# Patient Record
Sex: FEMALE | Race: WHITE
[De-identification: names, ages, dates, MRNs, and addresses within clinical notes are randomized per-mention and may not be internally consistent; named-entity substitution may affect disease eponyms.]

---

## 2018-12-23 ENCOUNTER — HOSPITAL ENCOUNTER (EMERGENCY)
Dept: HOSPITAL 74 - JERFT | Age: 63
Discharge: HOME | End: 2018-12-23
Payer: COMMERCIAL

## 2018-12-23 VITALS — SYSTOLIC BLOOD PRESSURE: 147 MMHG | DIASTOLIC BLOOD PRESSURE: 72 MMHG | HEART RATE: 85 BPM | TEMPERATURE: 98.1 F

## 2018-12-23 VITALS — BODY MASS INDEX: 30.9 KG/M2

## 2018-12-23 DIAGNOSIS — Z79.84: ICD-10-CM

## 2018-12-23 DIAGNOSIS — H10.31: Primary | ICD-10-CM

## 2018-12-23 DIAGNOSIS — E11.9: ICD-10-CM

## 2018-12-23 DIAGNOSIS — I10: ICD-10-CM

## 2018-12-23 NOTE — PDOC
History of Present Illness





- General


Chief Complaint: Eye Problem


Stated Complaint: SENT BY PCP R/O PINK EYE


Time Seen by Provider: 12/23/18 09:59


History Source: Patient


Exam Limitations: No Limitations





- History of Present Illness


Initial Comments: 





12/23/18 10:17


States had onset of redness and drainage to right eye since yesterday. Was seen 

at an urgent care who recommended referral to emergency department for 

evaluation of eye and illness. Patient states woke up this morning with a stuck 

together with yellow drainage. Denies pain, denies any visual changes, denies 

any history of eye injury or illness including glaucoma. Was concerned about 

her diabetes and treatment. Patient states blood sugars have been normal.


Timing/Duration: unsure, 24 hours


Severity: mild, moderate


Associated Symptoms: reports: denies symptoms





Past History





- Travel


Traveled outside of the country in the last 30 days: No


Close contact w/someone who was outside of country & ill: No





- Past Medical History


Allergies/Adverse Reactions: 


 Allergies











Allergy/AdvReac Type Severity Reaction Status Date / Time


 


No Known Allergies Allergy   Verified 12/23/18 09:17











Home Medications: 


Ambulatory Orders





Atorvastatin Ca [Lipitor] 20 mg PO HS 12/23/18 


Ciprofloxacin 0.3% Eye Drops [Ciloxan 0.3% Eye Drops --] 2 drop OD Q4HWA #1 

bottle 12/23/18 


Metformin HCl [Glucophage] 500 mg PO BID 12/23/18 








COPD: No


Diabetes: Yes


HTN: Yes





- Surgical History


Cholecystectomy: Yes





- Immunization History


Immunization Up to Date: Yes





- Suicide/Smoking/Psychosocial Hx


Smoking History: Never smoked


Information on smoking cessation initiated: No


Hx Alcohol Use: No


Drug/Substance Use Hx: No





**Review of Systems





- Review of Systems


Able to Perform ROS?: Yes


Is the patient limited English proficient: Yes


Constitutional: Yes: Symptoms Reported, See HPI, Malaise.  No: Fever


HEENTM: Yes: Symptoms Reported, See HPI, Tearing.  No: Eye Pain, Blurred Vision


Respiratory: Yes: See HPI.  No: Symptoms reported, Cough


Musculoskeletal: Yes: See HPI.  No: Symptoms Reported


All Other Systems: Reviewed and Negative





*Physical Exam





- Vital Signs


 Last Vital Signs











Temp Pulse Resp BP Pulse Ox


 


 98.1 F   85   18   147/72   96 


 


 12/23/18 09:18  12/23/18 09:18  12/23/18 09:18  12/23/18 09:18  12/23/18 09:18














- Physical Exam


General Appearance: Yes: Nourished, Appropriately Dressed, Apparent Distress


HEENT: positive: GARRETT, TMs Normal, Other (injected right eye, with tearing and 

yellowish drainage noted. Visual acuity is within normal limits. Is nontender 

and no pain with pressure  pushing eye)


Neck: positive: Supple.  negative: Tender, Lymphadenopathy (R), Lymphadenopathy 

(L)


Respiratory/Chest: positive: Lungs Clear





Moderate Sedation





- Procedure Monitoring


Vital Signs: 


Procedure Monitoring Vital Signs











Temperature  98.1 F   12/23/18 09:18


 


Pulse Rate  85   12/23/18 09:18


 


Respiratory Rate  18   12/23/18 09:18


 


Blood Pressure  147/72   12/23/18 09:18


 


O2 Sat by Pulse Oximetry (%)  96   12/23/18 09:18











Medical Decision Making





- Medical Decision Making





12/23/18 10:19


Conjunctivitis, O evidence of intraocular pressure or pain, vision is within 

normal limits. Will treat with ciprofloxacin drops





*DC/Admit/Observation/Transfer


Diagnosis at time of Disposition: 


Conjunctivitis


Qualifiers:


 Conjunctivitis type: acute Acute conjunctivitis type: bacterial Laterality: 

right Qualified Code(s): H10.31 - Unspecified acute conjunctivitis, right eye








- Discharge Dispostion


Disposition: HOME


Condition at time of disposition: Stable


Decision to Admit order: No





- Prescriptions


Prescriptions: 


Ciprofloxacin 0.3% Eye Drops [Ciloxan 0.3% Eye Drops --] 2 drop OD Q4HWA #1 

bottle





- Referrals


Referrals: 


Chacha Edmondson MD [Primary Care Provider] - 





- Patient Instructions


Printed Discharge Instructions:  DI for Conjunctivitis


Additional Instructions: 


Rest, avoid rubbing eyes


Wash hands frequently as this is very contagious


Wash hands, use eye drops as directed, wash hands after use


Do not share eyedrops with other person to may become infected as this will 

infect them





Ciprofloxacin drops 2 drops to affected eye 4 times a day for 5 days





Avoid contact with others until redness and discharge is gone from eyes.


Followup with ophthalmology or private physician as needed





- Post Discharge Activity

## 2020-01-16 ENCOUNTER — HOSPITAL ENCOUNTER (EMERGENCY)
Dept: HOSPITAL 74 - JER | Age: 65
LOS: 1 days | Discharge: ANOTHER HOSPITAL | End: 2020-01-17
Payer: COMMERCIAL

## 2020-01-16 VITALS — BODY MASS INDEX: 32.7 KG/M2

## 2020-01-16 VITALS — TEMPERATURE: 97.9 F

## 2020-01-16 DIAGNOSIS — E11.9: ICD-10-CM

## 2020-01-16 DIAGNOSIS — Z79.84: ICD-10-CM

## 2020-01-16 DIAGNOSIS — I71.2: Primary | ICD-10-CM

## 2020-01-16 DIAGNOSIS — E78.5: ICD-10-CM

## 2020-01-16 DIAGNOSIS — I10: ICD-10-CM

## 2020-01-16 LAB
ALBUMIN SERPL-MCNC: 3.2 G/DL (ref 3.4–5)
ALP SERPL-CCNC: 88 U/L (ref 45–117)
ALT SERPL-CCNC: 24 U/L (ref 13–61)
ANION GAP SERPL CALC-SCNC: 6 MMOL/L (ref 8–16)
AST SERPL-CCNC: 21 U/L (ref 15–37)
BASOPHILS # BLD: 0.8 % (ref 0–2)
BILIRUB SERPL-MCNC: 0.2 MG/DL (ref 0.2–1)
BUN SERPL-MCNC: 24.6 MG/DL (ref 7–18)
CALCIUM SERPL-MCNC: 8.8 MG/DL (ref 8.5–10.1)
CHLORIDE SERPL-SCNC: 107 MMOL/L (ref 98–107)
CO2 SERPL-SCNC: 25 MMOL/L (ref 21–32)
CREAT SERPL-MCNC: 1.6 MG/DL (ref 0.55–1.3)
DEPRECATED RDW RBC AUTO: 13.9 % (ref 11.6–15.6)
EOSINOPHIL # BLD: 3.7 % (ref 0–4.5)
GLUCOSE SERPL-MCNC: 218 MG/DL (ref 74–106)
HCT VFR BLD CALC: 36 % (ref 32.4–45.2)
HGB BLD-MCNC: 12 GM/DL (ref 10.7–15.3)
INR BLD: 0.87 (ref 0.83–1.09)
LYMPHOCYTES # BLD: 40.3 % (ref 8–40)
MCH RBC QN AUTO: 30.2 PG (ref 25.7–33.7)
MCHC RBC AUTO-ENTMCNC: 33.5 G/DL (ref 32–36)
MCV RBC: 90.2 FL (ref 80–96)
MONOCYTES # BLD AUTO: 6.4 % (ref 3.8–10.2)
NEUTROPHILS # BLD: 48.8 % (ref 42.8–82.8)
PLATELET # BLD AUTO: 270 K/MM3 (ref 134–434)
PMV BLD: 9.4 FL (ref 7.5–11.1)
POTASSIUM SERPLBLD-SCNC: 4.6 MMOL/L (ref 3.5–5.1)
PROT SERPL-MCNC: 7.9 G/DL (ref 6.4–8.2)
PT PNL PPP: 10.2 SEC (ref 9.7–13)
RBC # BLD AUTO: 3.99 M/MM3 (ref 3.6–5.2)
SODIUM SERPL-SCNC: 137 MMOL/L (ref 136–145)
WBC # BLD AUTO: 9.3 K/MM3 (ref 4–10)

## 2020-01-16 PROCEDURE — 3E033GC INTRODUCTION OF OTHER THERAPEUTIC SUBSTANCE INTO PERIPHERAL VEIN, PERCUTANEOUS APPROACH: ICD-10-PCS

## 2020-01-16 NOTE — PDOC
History of Present Illness





- General


Chief Complaint: Shortness of Breath


Stated Complaint: SOB


Time Seen by Provider: 01/16/20 16:20


History Source: Patient


Exam Limitations: No Limitations





- History of Present Illness


Initial Comments: 





01/16/20 16:55


HPI: 65yo F PMH DM, HTN, HLD, sent in by PCP Dr. Edmondson for multiple saccular 

aneurysms on CT earlier today in setting of workup for 2 months of progressive 

SOB. Patient denies pain at this time, endorses some mild chest discomfort 

yesterday. Her primary complaint is her progressive shortness of breath. Sent 

by PCP for incidental aneurysm found on CT this morning after CXR demonstrated 

concerning mass near the heart. 





Denies chest pain, cough, fevers, chills, nausea, vomiting, recent illnesses, 

sick contact, syncope. 





All: NKDA


Meds: per chart


PMH: as above


PSH: cholecystectomy, tonsils








Past History





- Travel


Traveled outside of the country in the last 30 days: No


Close contact w/someone who was outside of country & ill: No





- Past Medical History


Allergies/Adverse Reactions: 


 Allergies











Allergy/AdvReac Type Severity Reaction Status Date / Time


 


No Known Allergies Allergy   Verified 12/23/18 09:17











Home Medications: 


Ambulatory Orders





Atorvastatin Ca [Lipitor] 20 mg PO HS 12/23/18 


Ciprofloxacin 0.3% Eye Drops [Ciloxan 0.3% Eye Drops --] 2 drop OD Q4HWA #1 

bottle 12/23/18 


Metformin HCl [Glucophage] 500 mg PO BID 12/23/18 








COPD: No


Diabetes: Yes


HTN: Yes





- Surgical History


Cholecystectomy: Yes





- Immunization History


Immunization Up to Date: Yes





- Psycho Social/Smoking Cessation Hx


Smoking History: Former smoker


Have you smoked in the past 12 months: No


Information on smoking cessation initiated: No


Hx Alcohol Use: No


Drug/Substance Use Hx: No





**Review of Systems





- Review of Systems


Able to Perform ROS?: Yes


Is the patient limited English proficient: Yes


Constitutional: No: Chills, Diaphoresis, Fever


HEENTM: No: Recent change in vision, Nose Congestion, Throat Pain


Respiratory: Yes: Shortness of Breath, SOB with Exertion.  No: Cough, SOB at 

Rest, Wheezing, Productive cough, Hemoptysis


Cardiac (ROS): No: Chest Pain, Edema, Irregular Heart Rate, Palpitations, 

Syncope, Chest Tightness


ABD/GI: No: Constipated, Diarrhea, Nausea, Vomiting


: No: Dysuria, Discharge, Frequency


Musculoskeletal: No: Muscle Pain, Muscle Weakness, Neck Pain


Integumentary: No: Erythema, Pallor, Rash


Neurological: No: Headache, Numbness, Tingling, Weakness


Psychiatric: No: Stressors, Change in Appetite


Endocrine: No: Increased Thirst, Increased Urine, Change in Weight


Hematologic/Lymphatic: No: Anemia, Blood Clots, Easy Bleeding


All Other Systems: Reviewed and Negative





*Physical Exam





- Vital Signs


 Last Vital Signs











Temp Pulse Resp BP Pulse Ox


 


 97.9 F   72   16   183/79 H  99 


 


 01/16/20 16:22  01/16/20 16:22  01/16/20 16:22  01/16/20 16:22  01/16/20 16:22














- Physical Exam





01/16/20 18:56


Vitals reviewed, AFVSS, hypertensive to 180 sbp


GEN: Well appearing, appears stated age, NAD, comfortable. AAOx3.


HEENT: NCAT, EOMI, PERRL. Sclera anicteric, noninjected. No facial asymmetry. 

Moist mucous membranes. Normal voice. Trachea midline. 


CV: RRR, S1/S2, no murmurs / rubs / gallops appreciated.


LUNG: CTAB, normal work of breathing. No wheezes, rales, rhonchi. No cough. 

Speaking full sentences. 


GI: Soft, NTND, no guarding, no rebound. No masses / pulsations. Neg CVAT b/l. 


EXTREMITIES: 2+ distal pulses. No LE edema. No obvious deformities of all 

extremities. 


SKIN: Warm, dry, no rashes appreciated, non-jaundiced.


PSYCH: Normal mood and affect. Cooperative and appropriate. 


NEURO: CN grossly intact. Moving all extremities well. Normal strength and 

sensation grossly.








ED Treatment Course





- LABORATORY


CBC & Chemistry Diagram: 


 01/16/20 16:40





 01/16/20 16:40





Medical Decision Making





- Medical Decision Making





01/16/20 17:22


65yo F PMH DM, HTN, HLD, sent in by PCP Dr. Edmondson for multiple incidental 

saccular aneurysms on CT earlier today in setting of workup for 2 months of 

progressive SOB. Patient for CTA and possible transfer with CT surgical 

evaluation.  





Spoke with Dr. Edmondson, concern for aneurysm, sent for CTA.





- CBC, CMP, Coags, T&S


- CTA Chest/Abd/Pelvis





01/16/20 17:55


- Taken to CT on cardiac monitor by myself


- PT GFR noted to be 33, Cr 1.6


- CTA still medically necessary given urgency of possible dissecting aneurysm


- Patient consentable, knows risks and benefits 





01/16/20 18:30


- CTA done, pending read


- Patient asymptomatic, anxious, eager to go home, counselled on severity of 

her situation


- On continuous cardiac monitor back in bed





01/16/20 19:05


-Patient endorsed to Dr. Meredith, overnight resident











Discharge





- Discharge Information


Problems reviewed: Yes


Clinical Impression/Diagnosis: 


 Shortness of breath





Aortic aneurysm


Qualifiers:


 Aortic location: thoracic aorta Presence of rupture: without rupture Qualified 

Code(s): I71.2 - Thoracic aortic aneurysm, without rupture





Condition: Stable





- Follow up/Referral





- Patient Discharge Instructions





- Post Discharge Activity

## 2020-01-16 NOTE — PDOC
*Physical Exam





- Vital Signs


 Last Vital Signs











Temp Pulse Resp BP Pulse Ox


 


 97.9 F   72   16   183/79 H  99 


 


 01/16/20 16:22  01/16/20 16:22  01/16/20 16:22  01/16/20 16:22  01/16/20 19:10














- Physical Exam


General Appearance: Yes: Nourished, Appropriately Dressed.  No: Apparent 

Distress


HEENT: positive: EOMI, GARRETT, Normal ENT Inspection, Normal Voice


Neck: positive: Supple


Respiratory/Chest: positive: Lungs Clear


Cardiovascular: positive: Regular Rhythm, Regular Rate, S1, S2


Vascular Pulses: Dorsalis-Pedis (R): 2+, Doralis-Pedis (L): 2+


Gastrointestinal/Abdominal: positive: Soft


Musculoskeletal: positive: Normal Inspection


Extremity: positive: Normal Capillary Refill


Integumentary: positive: Normal Color


Neurologic: positive: Alert





ED Treatment Course





- LABORATORY


CBC & Chemistry Diagram: 


 01/16/20 16:40





 01/16/20 16:40





- ADDITIONAL ORDERS


Additional order review: 


 Laboratory  Results











  01/16/20 01/16/20





  16:40 16:40


 


PT with INR  10.20 


 


INR  0.87 


 


Sodium   137


 


Potassium   4.6


 


Chloride   107


 


Carbon Dioxide   25


 


Anion Gap   6 L


 


BUN   24.6 H


 


Creatinine   1.6 H


 


Est GFR (CKD-EPI)AfAm   38.52


 


Est GFR (CKD-EPI)NonAf   33.23


 


Random Glucose   218 H


 


Calcium   8.8


 


Total Bilirubin   0.2


 


AST   21


 


ALT   24


 


Alkaline Phosphatase   88


 


Creatine Kinase   104


 


Troponin I   < 0.02


 


Total Protein   7.9


 


Albumin   3.2 L








 











  01/16/20





  16:40


 


RBC  3.99


 


MCV  90.2


 


MCHC  33.5


 


RDW  13.9


 


MPV  9.4


 


Neutrophils %  48.8


 


Lymphocytes %  40.3 H


 


Monocytes %  6.4


 


Eosinophils %  3.7


 


Basophils %  0.8














- Medications


Given in the ED: 


ED Medications














Discontinued Medications














Generic Name Dose Route Start Last Admin





  Trade Name Freq  PRN Reason Stop Dose Admin


 


Sodium Chloride  1,000 ml  01/16/20 17:42  01/16/20 19:05





  Normal Saline -  IV  01/16/20 17:43  1,000 ml





  ONCE ONE   Administration





     





     





     





     














Medical Decision Making





- Medical Decision Making





Patient signed out to me pending Cardine drip for BP and transfer to [place] 


- Cardine drip ordered





[facility] - accepting doc at [place]


- Patient left to [place]











Discharge





- Discharge Information


Problems reviewed: Yes


Clinical Impression/Diagnosis: 


 Shortness of breath





Aortic aneurysm


Qualifiers:


 Aortic location: thoracic aorta Presence of rupture: without rupture Qualified 

Code(s): I71.2 - Thoracic aortic aneurysm, without rupture





Condition: Stable


Disposition: TRANSFER ACUTE CARE/OTHER HOSP





- Admission


No





- Follow up/Referral


Referrals: 


Chacha Edmondson MD [Primary Care Provider] - 





- Patient Discharge Instructions





- Post Discharge Activity





- Transfer to Acute Care Facility


Accepting Physician:: Dr. Shannon

## 2020-01-16 NOTE — PDOC
Documentation entered by Omero Brito SCRIBE, acting as scribe for Ashley Ling DO.








Ashley Ling DO:  This documentation has been prepared by the Daryl corrales Daniel, SCRIBE, under my direction and personally reviewed by me in its 

entirety.  I confirm that the documentation accurately reflects all work, 

treatment, procedures, and medical decision making performed by me.  





Attending Attestation





- Resident


Resident Name: FranklynVaibhav





- ED Attending Attestation


I have performed the following: I have examined & evaluated the patient, The 

case was reviewed & discussed with the resident, I agree w/resident's findings 

& plan, Exceptions are as noted





- HPI


HPI: 





01/16/20 18:48


The patient is a 64 year old female with a past medical history of diabetes, HTN

, and HLD here today for evaluation of shortness of breath. The patient reports 

that she has had shortness of breath for 2 months which is worse with exertion 

and was sent in by her PCP today. As per Dr. Edmondson, the patient had a chest x-

ray and then a chest CT which showed multiple saccular aneurysms. Patient also 

had chest pain yesterday which has since resolved.


 


Patient denies headache, lightheadedness. Denies fever, chills. Denies chest 

pain. Denies nausea, vomiting, diarrhea, abdominal pain. 





Allergies: NKA


PCP: Chacha Edmondson








- Physicial Exam


PE: 





01/16/20 19:49





Constitutional: Awake, alert, oriented.  No acute distress.


Head:  Normocephalic.  Atraumatic


Eyes:  PERRL. EOMI.  Conjunctivae are not pale.


ENT:  Mucous membranes are moist and intact. Posterior pharynx without exudates 

or erythema. Uvula midline.


Neck:  Supple.  Full ROM. No lymphadenopathy.


Cardiovascular:  Regular rate.  Regular rhythm. S1, S2 regular.  Distal pulses 

are 2+ and symmetric.  


Pulmonary/Chest:  No evidence of respiratory distress.  Clear to auscultation 

bilaterally  No wheezing, rales or rhonchi.


Abdominal:  Soft and non-distended.  There is no tenderness.  No rebound, 

guarding or rigidity.  No organomegaly. No palpable masses. Good bowel sounds.


Back:  No CVA tenderness.


Musculoskeletal:  No edema.  No cyanosis.  No clubbing.  Full range of motion 

in all extremities.  No calf tenderness. Radial/pedal pulses are intact and 2+ 

bilaterally


Skin:  Skin is warm and dry.  No petechiae.  No purpura.  


Neurological:  Alert and oriented to person, place, and time.  Cranial nerves II

-XII are grossly intact. Normal speech. Strength is grossly symmetric. No 

sensory deficits.


Psychiatric:  Good eye contact.  Normal interaction, affect and behavior.








- Medical Decision Making





01/16/20 19:46


63yo female with intermittent cp/sob - especially with exertion


-sent for outpt ct chest with dr. edmondson today


-sacular aneurysm seen on dry ct


-sent to er for further eval


-labs and cta chest/abd/pelvis ordered


-pt denies cp/sob at this time


-


01/16/20 19:46


cta chest shows a large saccular aneurysm with a thrombus in it


case discussed with Dr. Edmondson and Dr. Arguello who recommend transfer


pt requesting transfer to [facility]


case discussed wt Dr. wyatt at [facility] who accepts pt in transfer to [facility]


pending bed placement


-pt signing the transfer paperwork


cardene ordered for bp control


pt being monitored on cardiac monitor


NPO


01/17/20 01:41


medics [facility]





**Heart Score/ECG Review





- ECG Intrepretation


Comment:: 





01/16/20 19:48


sinus at 69, nl axis, nl inerval, no acute st/t wave findings

## 2020-01-16 NOTE — PDOC
Rapid Medical Evaluation


Time Seen by Provider: 01/16/20 16:20


Medical Evaluation: 


 Allergies











Allergy/AdvReac Type Severity Reaction Status Date / Time


 


No Known Allergies Allergy   Verified 12/23/18 09:17











01/16/20 16:20


I performed a brief in-person evaluation of this patient.


64-year-old female referred by Dr. Edmondson after outpatient non-contrast CT 

chest today demonstrated multiple aortic aneurysms. Complaining of one month of 

SOB. No chest pain.





Pertinent physical exam findings:





Alert, oriented, no distress.


RRR, S1/S2, soft systolic murmur.


/79.





I have ordered the following:


EKG


Cardiac labs





Patient to proceed to the ED for further evaluation.








**Discharge Disposition





- Diagnosis


 Shortness of breath








- Referrals





- Patient Instructions





- Post Discharge Activity

## 2020-01-17 VITALS — SYSTOLIC BLOOD PRESSURE: 142 MMHG | DIASTOLIC BLOOD PRESSURE: 81 MMHG | HEART RATE: 75 BPM

## 2020-01-17 NOTE — EKG
Test Reason : 

Blood Pressure : ***/*** mmHG

Vent. Rate : 069 BPM     Atrial Rate : 069 BPM

   P-R Int : 162 ms          QRS Dur : 076 ms

    QT Int : 416 ms       P-R-T Axes : 046 -06 077 degrees

   QTc Int : 445 ms

 

NORMAL SINUS RHYTHM

NORMAL ECG

NO PREVIOUS ECGS AVAILABLE

Confirmed by HOSSEIN CONRAD MD ([id number]) on 1/17/2020 1:47:19 PM

 

Referred By:             Confirmed By:HOSSEIN CONRAD MD

## 2020-02-10 ENCOUNTER — HOSPITAL ENCOUNTER (EMERGENCY)
Dept: HOSPITAL 74 - JER | Age: 65
Discharge: LEFT BEFORE DISCHARGE | End: 2020-02-10
Payer: COMMERCIAL

## 2020-02-10 VITALS — DIASTOLIC BLOOD PRESSURE: 75 MMHG | HEART RATE: 88 BPM | SYSTOLIC BLOOD PRESSURE: 127 MMHG

## 2020-02-10 VITALS — BODY MASS INDEX: 30.7 KG/M2

## 2020-02-10 VITALS — TEMPERATURE: 97.8 F

## 2020-02-10 DIAGNOSIS — Z86.79: ICD-10-CM

## 2020-02-10 DIAGNOSIS — E11.9: ICD-10-CM

## 2020-02-10 DIAGNOSIS — I10: ICD-10-CM

## 2020-02-10 DIAGNOSIS — Z98.890: ICD-10-CM

## 2020-02-10 DIAGNOSIS — I77.819: ICD-10-CM

## 2020-02-10 DIAGNOSIS — R06.02: Primary | ICD-10-CM

## 2020-02-10 DIAGNOSIS — E78.5: ICD-10-CM

## 2020-02-10 DIAGNOSIS — Z79.84: ICD-10-CM

## 2020-02-10 LAB
ALBUMIN SERPL-MCNC: 2.6 G/DL (ref 3.4–5)
ALP SERPL-CCNC: 387 U/L (ref 45–117)
ALT SERPL-CCNC: 33 U/L (ref 13–61)
ANION GAP SERPL CALC-SCNC: 8 MMOL/L (ref 8–16)
AST SERPL-CCNC: 19 U/L (ref 15–37)
BASOPHILS # BLD: 1.2 % (ref 0–2)
BILIRUB SERPL-MCNC: 0.4 MG/DL (ref 0.2–1)
BUN SERPL-MCNC: 15.5 MG/DL (ref 7–18)
CALCIUM SERPL-MCNC: 8.8 MG/DL (ref 8.5–10.1)
CHLORIDE SERPL-SCNC: 101 MMOL/L (ref 98–107)
CO2 SERPL-SCNC: 25 MMOL/L (ref 21–32)
CREAT SERPL-MCNC: 1.2 MG/DL (ref 0.55–1.3)
DEPRECATED RDW RBC AUTO: 14.5 % (ref 11.6–15.6)
EOSINOPHIL # BLD: 2.6 % (ref 0–4.5)
GLUCOSE SERPL-MCNC: 99 MG/DL (ref 74–106)
HCT VFR BLD CALC: 29.5 % (ref 32.4–45.2)
HGB BLD-MCNC: 10 GM/DL (ref 10.7–15.3)
LYMPHOCYTES # BLD: 17.8 % (ref 8–40)
MCH RBC QN AUTO: 30.8 PG (ref 25.7–33.7)
MCHC RBC AUTO-ENTMCNC: 33.8 G/DL (ref 32–36)
MCV RBC: 91.1 FL (ref 80–96)
MONOCYTES # BLD AUTO: 6.7 % (ref 3.8–10.2)
NEUTROPHILS # BLD: 71.7 % (ref 42.8–82.8)
PLATELET # BLD AUTO: 517 K/MM3 (ref 134–434)
PMV BLD: 7.4 FL (ref 7.5–11.1)
POTASSIUM SERPLBLD-SCNC: 4.1 MMOL/L (ref 3.5–5.1)
PROT SERPL-MCNC: 7.9 G/DL (ref 6.4–8.2)
RBC # BLD AUTO: 3.23 M/MM3 (ref 3.6–5.2)
SODIUM SERPL-SCNC: 135 MMOL/L (ref 136–145)
WBC # BLD AUTO: 9.8 K/MM3 (ref 4–10)

## 2020-02-10 PROCEDURE — 3E033NZ INTRODUCTION OF ANALGESICS, HYPNOTICS, SEDATIVES INTO PERIPHERAL VEIN, PERCUTANEOUS APPROACH: ICD-10-PCS | Performed by: STUDENT IN AN ORGANIZED HEALTH CARE EDUCATION/TRAINING PROGRAM

## 2020-02-10 PROCEDURE — 3E033GC INTRODUCTION OF OTHER THERAPEUTIC SUBSTANCE INTO PERIPHERAL VEIN, PERCUTANEOUS APPROACH: ICD-10-PCS | Performed by: STUDENT IN AN ORGANIZED HEALTH CARE EDUCATION/TRAINING PROGRAM

## 2020-02-10 NOTE — PDOC
History of Present Illness





- General


Chief Complaint: Congestive Heart Failure


Stated Complaint: SENT BY DR KEY/ IV AND XRAY NEEDED


Time Seen by Provider: 02/10/20 12:40


History Source: Patient


Exam Limitations: No Limitations





- History of Present Illness


Initial Comments: 





02/10/20 14:56





65 yo female pmh HTN, HLD, DM, s/p saccular aortic aneurysm repair ([facility] by Dr. Cunningham ) (01/2020) presents to the ED from PCP [facility] 

for decreased breath sounds. Discussed case with PCP, states this is the first 

appointment since aneurysm repair, noted decreased breath sounds on the right 

and believes the pt requires IV lasix. 





Pt states she went to [place] last week due to persistent SOB after surgery, 

was told these are normal post op changes and sent home. Denies any new changes 

or medical concerns since her visit to [place], denies CP, back pain, 

abdominal pain, palpitations, changes in bowel or bladder habits, F/C/N/V











Past History





- Past Medical History


Allergies/Adverse Reactions: 


 Allergies











Allergy/AdvReac Type Severity Reaction Status Date / Time


 


No Known Allergies Allergy   Verified 12/23/18 09:17











Home Medications: 


Ambulatory Orders





Atenolol [Tenormin -] 50 mg PO DAILY 01/22/20 


Glipizide [Glucotrol] 10 mg PO DAILY 01/22/20 


Metformin HCl [Glucophage] 1,000 mg PO DAILY 01/22/20 








COPD: No


Diabetes: Yes


HTN: Yes





- Surgical History


Cholecystectomy: Yes





- Immunization History


Immunization Up to Date: Yes





- Psycho Social/Smoking Cessation Hx


Smoking History: Never smoked


Have you smoked in the past 12 months: No


Hx Alcohol Use: No


Drug/Substance Use Hx: No





**Review of Systems





- Review of Systems


Constitutional: Yes: See HPI


HEENTM: Yes: See HPI


Respiratory: Yes: See HPI


Cardiac (ROS): Yes: See HPI


ABD/GI: Yes: See HPI


: Yes: See HPI


Musculoskeletal: Yes: See HPI


Integumentary: Yes: See HPI


Neurological: Yes: See HPI





*Physical Exam





- Vital Signs


 Last Vital Signs











Temp Pulse Resp BP Pulse Ox


 


 97.8 F   79   18   114/55 L  97 


 


 02/10/20 12:38  02/10/20 12:38  02/10/20 12:38  02/10/20 12:38  02/10/20 12:38














- Physical Exam


General Appearance: Yes: Nourished, Appropriately Dressed.  No: Apparent 

Distress


HEENT: positive: EOMI


Neck: positive: Supple.  negative: Carotid bruit


Respiratory/Chest: positive: Decreased Breath Sounds (bilaterally).  negative: 

Respiratory Distress, Accessory Muscle Use, Rapid RR, Crackles, Rales, Rhonchi, 

Stridor, Wheezing


Cardiovascular: positive: Regular Rhythm, Regular Rate, S1, S2, Edema (since 

Surgery pt had diffuse edema, treated with lasix).  negative: JVD, Murmur


Vascular Pulses: Dorsalis-Pedis (R): 3+, Doralis-Pedis (L): 3+


Gastrointestinal/Abdominal: positive: Flat, Soft.  negative: Pulsatile Mass, 

Distended, Guarding, Rebound, Tenderness


Musculoskeletal: negative: CVA Tenderness


Extremity: positive: Normal Capillary Refill


Integumentary: positive: Normal Color, Dry, Warm


Neurologic: positive: Fully Oriented, Alert, Normal Mood/Affect, Normal Response

, Motor Strength 5/5





ED Treatment Course





- LABORATORY


CBC & Chemistry Diagram: 


 02/10/20 15:20





 02/10/20 15:20





- RADIOLOGY


Radiology Studies Ordered: 














 Category Date Time Status


 


 CHEST X-RAY PORTABLE* [RAD] Stat Radiology  02/10/20 13:44 Completed














Medical Decision Making





- Medical Decision Making





02/10/20 15:03


65 yo female pmh HTN, HLD, DM, s/p saccular aortic aneurysm repair ([facility] by Dr. Cunningham ) (01/2020) presents to the ED from [facility] 

for decreased breath sounds. Discussed case with PCP, states this is the first 

appointment since aneurysm repair, noted decreased breath sounds on the right 

and believes the pt requires IV lasix. 





Pt states she went to [place] last week due to persistent SOB after surgery, 

was told these are normal post op changes and sent home. Denies any new changes 

or medical concerns since her visit to [place], denies CP, back pain, 

abdominal pain, palpitations, changes in bowel or bladder habits, F/C/N/V





Vitals WNL





Decreased breath sounds noted bilaterally, CXR shows bilateral pleural 

effusions and dilated aortic knob





Will do CT chest with contrast due to abnormal appearance and discuss with 

Cardio thoracic team at [place] 

















02/10/20 15:06


Attempted to place IV in pt, she will not tolerate the tourniquet and requests 

multiple breaks. Pending labs and CT 





Pt states she will leave against medical advice due to no assistance with 

attempting to use the bathroom





The patient is clinically not intoxicated, free from distracting pain, appears 

to have intact insight, judgment and reason and in my medical opinion has the 

capacity to make decisions. The patient is also not under any duress to leave 

the hospital. In this scenario, it would be battery to subject a patient to 

treatment against his/her will. I have voiced my concerns for the patient's 

health given that a full evaluation and treatment had not occurred. I have 

discussed the need for continued evaluation to determine if their symptoms are 

caused by a condition that present risk of death or morbidity. Risks including 

but not limited to death, permanent disability, prolonged hospitalization, 

prolonged illness, were discussed. I tried offering alternative options in 

hopes that the patient might be amenable to partial evaluation and treatment 

which would be medically beneficial to the patient, though the patient declined 

my options and insisted on leaving. Because I have been unable to convince the 

patient to stay, I answered all of their questions about their condition and 

asked them to return to the ED as soon as possible to complete their evaluation

, especially if their symptoms worsen or do not improve. I emphasized that 

leaving against medical advice does not preclude returning here for further 

evaluation. I asked the patient to return if they change their mind about the 

further evaluation and treatment. I strongly encouraged the patient to return 

to this Emergency Department or any Emergency Department at any time, 

particularly with worsening symptoms.





Pt signed AMA form and departed the ED





Discharge





- Discharge Information


Problems reviewed: Yes


Clinical Impression/Diagnosis: 


 Shortness of breath





Disposition: AGAINST MEDICAL ADVICE





- Follow up/Referral


Referrals: 


Chacha Key MD [Primary Care Provider] - 





- Patient Discharge Instructions





- Post Discharge Activity

## 2020-02-10 NOTE — PDOC
Documentation entered by Diane Rosen SCRIBE, acting as scribe for Felix Falcon MD.








Felix Falcon MD:  This documentation has been prepared by the Jessika corrales Nirvannie, SCRIBE, under my direction and personally reviewed by me in its 

entirety.  I confirm that the documentation accurately reflects all work, 

treatment, procedures, and medical decision making performed by me.  





Attending Attestation





- Resident


Resident Name: Romie Anne





- ED Attending Attestation


I have performed the following: I have examined & evaluated the patient, The 

case was reviewed & discussed with the resident, I agree w/resident's findings 

& plan, Exceptions are as noted





- HPI


HPI: 





02/10/20 14:24


The patient is a 64 year old female, with a significant past medical history of 

diabetes, htn and hld who presents to the emergency department with shortness 

of breath and bilateral lower extremity edema. As per patient, he was advised 

by Dr. Edmondson to report to the ed for iv lasix.  Patient states that she had 

thoracic aneurysm surgery 3 weeks ago was discharged 10 days ago has been 

feeling mild pain and discomfort on her sternotomy scar that has been gradually 

improving since the surgery.  The patient does endorse mild shortness of breath 

with exertion that is chronic for her.  Patient also endorses slightly 

increased lower extremity edema without any discomfort.  She denies any cough, 

fever, chills, hemoptysis, leg swelling, calf pain.  





Allergies: NKDA 


Past surgical history: cholecystectomy, tonsillectomy.


Social history: Nonsmoker. Denies EtOH use and recreational drug use. 


Primary Care Physician: Dr. Edmondson 











- Physicial Exam


PE: 





02/10/20 15:48


GENERAL: The patient is awake, alert, and fully oriented, Nontoxic - in no 

acute distress.


HEAD: Normocephalic, atraumatic.


EYES: extraocular movements intact, sclera anicteric, conjunctiva clear.


ENT: Normal voice,  Moist mucous membranes.


NECK: endaredectomy wound  with staples in place no erythema, induration, warmth

, fluctuance noted, there is some flaking skin apparent


LUNGS: Breath sounds equal, clear to auscultation bilaterally.  No wheezes, no 

rhonchi, no rales.


HEART: Regular rate and rhythm, normal S1 and S2 without murmur, rub or gallop, 

sternotomy scars presents that are clean dry and intact without erythema, 

induration, warmth


ABDOMEN: Soft, nontender, No guarding, no rebound.  No CVA tenderness


EXTREMITIES: Normal range of motion, +1 edema, neg homans/tenderness.  


NEUROLOGICAL: No facial assymetry, Normal speech, moving all 4 extremities 

spontaneously symmetrically


PSYCH: Normal mood, normal affect.


SKIN: Warm, Dry, normal turgor,











- Medical Decision Making





02/10/20 13:52


Patient's only primary complaint is chronic shortness of breath, without any 

worsening, does endorse some mild superficial chest discomfort consistent with 

her open heart surgery. 





We will check a chest x-ray, basic labs, will give the patient some Lasix





Will reassess and discuss with PMD


02/10/20 16:58


The patient's labs were reviewed


The patient is electing to leave AGAINST MEDICAL ADVICE


Patient's chest x-ray did have a widened mediastinum may be normal secondary to 

the surgery however the patient declines obtaining a CT of her chest states 

that she will follow-up with the PMD tomorrow and with her surgeon as 

scheduled. 


Patient is feeling otherwise well denies any current shortness of breath, chest 

pain, back pain, numbness, tingling, weakness. 





Patient understands that she may return anytime to complete her work-up

## 2020-02-10 NOTE — PDOC
Rapid Medical Evaluation


Time Seen by Provider: 02/10/20 12:40


Medical Evaluation: 


 Allergies











Allergy/AdvReac Type Severity Reaction Status Date / Time


 


No Known Allergies Allergy   Verified 12/23/18 09:17











02/10/20 12:40


This patient had rapid medical evaluation in triage





cc:shortness of breath





HPI:Patient sent to ed by pmd for iv lasix. As per patient 


     md states her left left has fluid. Complaining of shortness of breath


     especially with exertion.





PE: NAD


     unlabored breathing, diminished lung sound on left


     heart s1s2





Orders: 


chest xray, iv access and labs


This patient will proceed to ed for further evaluation.





**Discharge Disposition





- Diagnosis


 Shortness of breath








- Referrals





- Patient Instructions





- Post Discharge Activity